# Patient Record
Sex: MALE | Race: OTHER | Employment: FULL TIME | URBAN - METROPOLITAN AREA
[De-identification: names, ages, dates, MRNs, and addresses within clinical notes are randomized per-mention and may not be internally consistent; named-entity substitution may affect disease eponyms.]

---

## 2015-06-08 LAB — ANTIBODY: NORMAL

## 2023-04-27 LAB
CHOLESTEROL, TOTAL: 184 MG/DL
CHOLESTEROL/HDL RATIO: NORMAL
HDLC SERPL-MCNC: 47 MG/DL (ref 35–70)
LDL CHOLESTEROL CALCULATED: 115 MG/DL (ref 0–160)
NONHDLC SERPL-MCNC: 137 MG/DL
TRIGL SERPL-MCNC: 110 MG/DL
VLDLC SERPL CALC-MCNC: NORMAL MG/DL

## 2023-08-10 LAB
ESTIMATED AVERAGE GLUCOSE: NORMAL
HBA1C MFR BLD: 9.8 %

## 2024-01-26 ENCOUNTER — OFFICE VISIT (OUTPATIENT)
Dept: PRIMARY CARE CLINIC | Age: 64
End: 2024-01-26
Payer: COMMERCIAL

## 2024-01-26 VITALS
SYSTOLIC BLOOD PRESSURE: 122 MMHG | HEART RATE: 93 BPM | TEMPERATURE: 97.2 F | WEIGHT: 202.7 LBS | BODY MASS INDEX: 28.38 KG/M2 | RESPIRATION RATE: 18 BRPM | DIASTOLIC BLOOD PRESSURE: 74 MMHG | HEIGHT: 71 IN | OXYGEN SATURATION: 90 %

## 2024-01-26 DIAGNOSIS — Z23 NEED FOR VACCINATION: ICD-10-CM

## 2024-01-26 DIAGNOSIS — Z12.5 ENCOUNTER FOR SCREENING FOR MALIGNANT NEOPLASM OF PROSTATE: ICD-10-CM

## 2024-01-26 DIAGNOSIS — E55.9 VITAMIN D DEFICIENCY: ICD-10-CM

## 2024-01-26 DIAGNOSIS — K21.9 GASTROESOPHAGEAL REFLUX DISEASE WITHOUT ESOPHAGITIS: ICD-10-CM

## 2024-01-26 DIAGNOSIS — E78.2 MIXED HYPERLIPIDEMIA: ICD-10-CM

## 2024-01-26 DIAGNOSIS — I10 PRIMARY HYPERTENSION: ICD-10-CM

## 2024-01-26 DIAGNOSIS — E11.8 DIABETES MELLITUS TYPE 2 WITH COMPLICATIONS (HCC): Primary | ICD-10-CM

## 2024-01-26 DIAGNOSIS — J45.50 SEVERE PERSISTENT ASTHMA WITHOUT COMPLICATION: ICD-10-CM

## 2024-01-26 PROBLEM — I25.10 CORONARY ATHEROSCLEROSIS: Status: ACTIVE | Noted: 2020-07-30

## 2024-01-26 PROBLEM — M54.12 CERVICAL RADICULOPATHY: Status: RESOLVED | Noted: 2022-11-23 | Resolved: 2024-01-26

## 2024-01-26 PROBLEM — M54.12 CERVICAL RADICULOPATHY: Status: ACTIVE | Noted: 2022-11-23

## 2024-01-26 PROBLEM — H90.2 CHL (CONDUCTIVE HEARING LOSS): Status: ACTIVE | Noted: 2018-09-05

## 2024-01-26 PROBLEM — N20.0 NEPHROLITHIASIS: Status: ACTIVE | Noted: 2024-01-26

## 2024-01-26 LAB
25(OH)D3 SERPL-MCNC: 25.8 NG/ML
ALBUMIN SERPL-MCNC: 4.2 G/DL (ref 3.4–5)
ALBUMIN/GLOB SERPL: 1.7 {RATIO} (ref 1.1–2.2)
ALP SERPL-CCNC: 99 U/L (ref 40–129)
ALT SERPL-CCNC: 15 U/L (ref 10–40)
ANION GAP SERPL CALCULATED.3IONS-SCNC: 13 MMOL/L (ref 3–16)
AST SERPL-CCNC: 15 U/L (ref 15–37)
BILIRUB SERPL-MCNC: 0.4 MG/DL (ref 0–1)
BUN SERPL-MCNC: 9 MG/DL (ref 7–20)
CALCIUM SERPL-MCNC: 9.2 MG/DL (ref 8.3–10.6)
CHLORIDE SERPL-SCNC: 101 MMOL/L (ref 99–110)
CHOLEST SERPL-MCNC: 264 MG/DL (ref 0–199)
CO2 SERPL-SCNC: 26 MMOL/L (ref 21–32)
CREAT SERPL-MCNC: 0.6 MG/DL (ref 0.8–1.3)
CREATININE URINE POCT: 100
DEPRECATED RDW RBC AUTO: 14.1 % (ref 12.4–15.4)
FOLATE SERPL-MCNC: 13.25 NG/ML (ref 4.78–24.2)
GFR SERPLBLD CREATININE-BSD FMLA CKD-EPI: >60 ML/MIN/{1.73_M2}
GLUCOSE SERPL-MCNC: 151 MG/DL (ref 70–99)
HCT VFR BLD AUTO: 46.4 % (ref 40.5–52.5)
HDLC SERPL-MCNC: 61 MG/DL (ref 40–60)
HGB BLD-MCNC: 15.3 G/DL (ref 13.5–17.5)
LDLC SERPL CALC-MCNC: 171 MG/DL
MAGNESIUM SERPL-MCNC: 2.1 MG/DL (ref 1.8–2.4)
MCH RBC QN AUTO: 29 PG (ref 26–34)
MCHC RBC AUTO-ENTMCNC: 33 G/DL (ref 31–36)
MCV RBC AUTO: 87.8 FL (ref 80–100)
MICROALBUMIN/CREAT 24H UR: 10 MG/G{CREAT}
MICROALBUMIN/CREAT UR-RTO: <30
PLATELET # BLD AUTO: 218 K/UL (ref 135–450)
PMV BLD AUTO: 8.6 FL (ref 5–10.5)
POTASSIUM SERPL-SCNC: 4.8 MMOL/L (ref 3.5–5.1)
PROT SERPL-MCNC: 6.7 G/DL (ref 6.4–8.2)
PSA SERPL DL<=0.01 NG/ML-MCNC: 0.83 NG/ML (ref 0–4)
RBC # BLD AUTO: 5.28 M/UL (ref 4.2–5.9)
SODIUM SERPL-SCNC: 140 MMOL/L (ref 136–145)
TRIGL SERPL-MCNC: 160 MG/DL (ref 0–150)
TSH SERPL DL<=0.005 MIU/L-ACNC: 1.48 UIU/ML (ref 0.27–4.2)
VIT B12 SERPL-MCNC: >2000 PG/ML (ref 211–911)
VLDLC SERPL CALC-MCNC: 32 MG/DL
WBC # BLD AUTO: 6.9 K/UL (ref 4–11)

## 2024-01-26 PROCEDURE — 3052F HG A1C>EQUAL 8.0%<EQUAL 9.0%: CPT | Performed by: FAMILY MEDICINE

## 2024-01-26 PROCEDURE — 90471 IMMUNIZATION ADMIN: CPT | Performed by: FAMILY MEDICINE

## 2024-01-26 PROCEDURE — 90677 PCV20 VACCINE IM: CPT | Performed by: FAMILY MEDICINE

## 2024-01-26 PROCEDURE — 99205 OFFICE O/P NEW HI 60 MIN: CPT | Performed by: FAMILY MEDICINE

## 2024-01-26 PROCEDURE — 3074F SYST BP LT 130 MM HG: CPT | Performed by: FAMILY MEDICINE

## 2024-01-26 PROCEDURE — 36415 COLL VENOUS BLD VENIPUNCTURE: CPT | Performed by: FAMILY MEDICINE

## 2024-01-26 PROCEDURE — 3078F DIAST BP <80 MM HG: CPT | Performed by: FAMILY MEDICINE

## 2024-01-26 PROCEDURE — 82044 UR ALBUMIN SEMIQUANTITATIVE: CPT | Performed by: FAMILY MEDICINE

## 2024-01-26 RX ORDER — INSULIN LISPRO 100 [IU]/ML
2 INJECTION, SOLUTION INTRAVENOUS; SUBCUTANEOUS
COMMUNITY
Start: 2013-08-28 | End: 2024-01-27

## 2024-01-26 RX ORDER — TAMSULOSIN HYDROCHLORIDE 0.4 MG/1
CAPSULE ORAL DAILY
COMMUNITY

## 2024-01-26 RX ORDER — MAGNESIUM OXIDE 400 MG/1
1 TABLET ORAL DAILY
COMMUNITY
Start: 2023-12-27

## 2024-01-26 RX ORDER — MONTELUKAST SODIUM 10 MG/1
10 TABLET ORAL EVERY MORNING
COMMUNITY
Start: 2013-04-19

## 2024-01-26 RX ORDER — EPINEPHRINE 0.3 MG/.3ML
INJECTION SUBCUTANEOUS
COMMUNITY
Start: 2020-05-19

## 2024-01-26 RX ORDER — ACYCLOVIR 400 MG/1
TABLET ORAL
COMMUNITY
Start: 2023-12-06 | End: 2024-01-27 | Stop reason: SDUPTHER

## 2024-01-26 RX ORDER — PIOGLITAZONEHYDROCHLORIDE 30 MG/1
30 TABLET ORAL EVERY EVENING
COMMUNITY
Start: 2023-12-30 | End: 2024-01-27 | Stop reason: SDUPTHER

## 2024-01-26 RX ORDER — LISINOPRIL 2.5 MG/1
2.5 TABLET ORAL DAILY
COMMUNITY

## 2024-01-26 RX ORDER — FLUTICASONE PROPIONATE AND SALMETEROL 500; 50 UG/1; UG/1
1 POWDER RESPIRATORY (INHALATION) 2 TIMES DAILY
COMMUNITY

## 2024-01-26 RX ORDER — IBUPROFEN 600 MG/1
TABLET ORAL
COMMUNITY
Start: 2024-01-17

## 2024-01-26 RX ORDER — ONDANSETRON 4 MG/1
TABLET, ORALLY DISINTEGRATING ORAL
COMMUNITY

## 2024-01-26 RX ORDER — INSULIN LISPRO 100 [IU]/ML
14 INJECTION, SOLUTION INTRAVENOUS; SUBCUTANEOUS
COMMUNITY
Start: 2023-01-26

## 2024-01-26 RX ORDER — BUDESONIDE 0.5 MG/2ML
INHALANT ORAL
COMMUNITY

## 2024-01-26 RX ORDER — ASPIRIN 81 MG/1
81 TABLET ORAL DAILY
COMMUNITY

## 2024-01-26 RX ORDER — ESOMEPRAZOLE MAGNESIUM 40 MG/1
1 CAPSULE, DELAYED RELEASE ORAL 2 TIMES DAILY
COMMUNITY

## 2024-01-26 RX ORDER — OMEPRAZOLE 40 MG/1
40 CAPSULE, DELAYED RELEASE ORAL DAILY PRN
COMMUNITY

## 2024-01-26 RX ORDER — OXYCODONE HYDROCHLORIDE 5 MG/1
TABLET ORAL
COMMUNITY
Start: 2024-01-17

## 2024-01-26 RX ORDER — IPRATROPIUM BROMIDE AND ALBUTEROL SULFATE 2.5; .5 MG/3ML; MG/3ML
SOLUTION RESPIRATORY (INHALATION)
COMMUNITY

## 2024-01-26 RX ORDER — AZELASTINE HYDROCHLORIDE, FLUTICASONE PROPIONATE 137; 50 UG/1; UG/1
1 SPRAY, METERED NASAL 2 TIMES DAILY PRN
COMMUNITY

## 2024-01-26 RX ORDER — ROSUVASTATIN CALCIUM 10 MG/1
1 TABLET, COATED ORAL DAILY
COMMUNITY
Start: 2022-11-18

## 2024-01-26 SDOH — ECONOMIC STABILITY: FOOD INSECURITY: WITHIN THE PAST 12 MONTHS, THE FOOD YOU BOUGHT JUST DIDN'T LAST AND YOU DIDN'T HAVE MONEY TO GET MORE.: NEVER TRUE

## 2024-01-26 SDOH — ECONOMIC STABILITY: INCOME INSECURITY: HOW HARD IS IT FOR YOU TO PAY FOR THE VERY BASICS LIKE FOOD, HOUSING, MEDICAL CARE, AND HEATING?: NOT HARD AT ALL

## 2024-01-26 SDOH — ECONOMIC STABILITY: HOUSING INSECURITY
IN THE LAST 12 MONTHS, WAS THERE A TIME WHEN YOU DID NOT HAVE A STEADY PLACE TO SLEEP OR SLEPT IN A SHELTER (INCLUDING NOW)?: NO

## 2024-01-26 SDOH — ECONOMIC STABILITY: FOOD INSECURITY: WITHIN THE PAST 12 MONTHS, YOU WORRIED THAT YOUR FOOD WOULD RUN OUT BEFORE YOU GOT MONEY TO BUY MORE.: NEVER TRUE

## 2024-01-26 ASSESSMENT — PATIENT HEALTH QUESTIONNAIRE - PHQ9
SUM OF ALL RESPONSES TO PHQ QUESTIONS 1-9: 0
SUM OF ALL RESPONSES TO PHQ QUESTIONS 1-9: 0
1. LITTLE INTEREST OR PLEASURE IN DOING THINGS: 0
SUM OF ALL RESPONSES TO PHQ QUESTIONS 1-9: 0
2. FEELING DOWN, DEPRESSED OR HOPELESS: 0
SUM OF ALL RESPONSES TO PHQ QUESTIONS 1-9: 0
SUM OF ALL RESPONSES TO PHQ9 QUESTIONS 1 & 2: 0

## 2024-01-26 NOTE — PROGRESS NOTES
Hyperlipidemia 11/14/2014    Hypertension 11/14/2014    Last Assessment & Plan:    Formatting of this note might be different from the original.   - Continue lisinopril 5 mg    Malignant neoplasm of urinary bladder (HCC) 07/18/2011    Microalbuminuria 03/08/2016    Moderate persistent asthma with acute exacerbation 06/07/2015    Nephrolithiasis 01/26/2024    Nonalcoholic fatty liver disease 08/08/2013    Obesity 05/14/2014    Obstructive sleep apnea syndrome 06/15/2012    Formatting of this note might be different from the original.   BPAP at 11/5 cwp  Formatting of this note might be different from the original.   Follows with Sleep Medicine. Prescribed AutoPap with minimum pressure of 5 and maximum of 14 cm water         Last Assessment & Plan:    Formatting of this note might be different from the original.   Waking up in AM with headaches and machine not workin    Vitamin D deficiency 12/01/2017    Last Assessment & Plan:    Formatting of this note might be different from the original.   - Continue vitamin D supplementation     History reviewed. No pertinent surgical history.   Social History     Tobacco Use    Smoking status: Never    Smokeless tobacco: Never   Substance Use Topics    Alcohol use: Not on file      History reviewed. No pertinent family history.  Current Outpatient Medications on File Prior to Visit   Medication Sig Dispense Refill    aspirin 81 MG EC tablet Take 1 tablet by mouth daily      Azelastine-Fluticasone 137-50 MCG/ACT SUSP 1 spray by Nasal route 2 times daily as needed      benralizumab (FASENRA) 30 MG/ML SOSY injection Inject 1 mL into the skin      budesonide (PULMICORT) 0.5 MG/2ML nebulizer suspension INHALE 2 MLS (0.5 MG TOTAL) BY NEBULIZATION 2 TIMES A DAY.      EPINEPHrine (EPIPEN) 0.3 MG/0.3ML SOAJ injection Use as needed for emergency allergic reaction      esomeprazole (NEXIUM) 40 MG delayed release capsule Take 1 capsule by mouth 2 times daily      fluticasone-salmeterol

## 2024-01-27 LAB
EST. AVERAGE GLUCOSE BLD GHB EST-MCNC: 211.6 MG/DL
HBA1C MFR BLD: 9 %

## 2024-01-27 RX ORDER — ACYCLOVIR 400 MG/1
1 TABLET ORAL
Qty: 12 EACH | Refills: 4 | Status: SHIPPED | OUTPATIENT
Start: 2024-01-27

## 2024-01-27 RX ORDER — PIOGLITAZONEHYDROCHLORIDE 30 MG/1
30 TABLET ORAL EVERY EVENING
Qty: 90 TABLET | Refills: 1 | Status: SHIPPED | OUTPATIENT
Start: 2024-01-27

## 2024-02-15 RX ORDER — ALBUTEROL SULFATE 90 UG/1
AEROSOL, METERED RESPIRATORY (INHALATION)
COMMUNITY

## 2024-02-23 ENCOUNTER — OFFICE VISIT (OUTPATIENT)
Dept: PRIMARY CARE CLINIC | Age: 64
End: 2024-02-23
Payer: COMMERCIAL

## 2024-02-23 VITALS
HEIGHT: 71 IN | OXYGEN SATURATION: 95 % | WEIGHT: 206.8 LBS | BODY MASS INDEX: 28.95 KG/M2 | SYSTOLIC BLOOD PRESSURE: 116 MMHG | DIASTOLIC BLOOD PRESSURE: 70 MMHG | TEMPERATURE: 97.4 F | HEART RATE: 70 BPM | RESPIRATION RATE: 18 BRPM

## 2024-02-23 DIAGNOSIS — E11.8 DIABETES MELLITUS TYPE 2 WITH COMPLICATIONS (HCC): ICD-10-CM

## 2024-02-23 DIAGNOSIS — E11.59 HYPERTENSION ASSOCIATED WITH TYPE 2 DIABETES MELLITUS (HCC): ICD-10-CM

## 2024-02-23 DIAGNOSIS — M54.50 ACUTE BILATERAL LOW BACK PAIN WITHOUT SCIATICA: ICD-10-CM

## 2024-02-23 DIAGNOSIS — M79.10 MUSCLE PAIN: ICD-10-CM

## 2024-02-23 DIAGNOSIS — E78.5 HYPERLIPIDEMIA ASSOCIATED WITH TYPE 2 DIABETES MELLITUS (HCC): Primary | ICD-10-CM

## 2024-02-23 DIAGNOSIS — I73.9 CLAUDICATION OF BOTH LOWER EXTREMITIES (HCC): ICD-10-CM

## 2024-02-23 DIAGNOSIS — E11.69 HYPERLIPIDEMIA ASSOCIATED WITH TYPE 2 DIABETES MELLITUS (HCC): Primary | ICD-10-CM

## 2024-02-23 DIAGNOSIS — I15.2 HYPERTENSION ASSOCIATED WITH TYPE 2 DIABETES MELLITUS (HCC): ICD-10-CM

## 2024-02-23 LAB
ALBUMIN SERPL-MCNC: 4.6 G/DL (ref 3.4–5)
ALBUMIN/GLOB SERPL: 1.8 {RATIO} (ref 1.1–2.2)
ALP SERPL-CCNC: 106 U/L (ref 40–129)
ALT SERPL-CCNC: 11 U/L (ref 10–40)
ANION GAP SERPL CALCULATED.3IONS-SCNC: 5 MMOL/L (ref 3–16)
AST SERPL-CCNC: 12 U/L (ref 15–37)
BACTERIA URNS QL MICRO: NORMAL /HPF
BILIRUB SERPL-MCNC: 0.5 MG/DL (ref 0–1)
BILIRUB UR QL STRIP.AUTO: NEGATIVE
BUN SERPL-MCNC: 14 MG/DL (ref 7–20)
CALCIUM SERPL-MCNC: 9.7 MG/DL (ref 8.3–10.6)
CHLORIDE SERPL-SCNC: 105 MMOL/L (ref 99–110)
CK SERPL-CCNC: 94 U/L (ref 39–308)
CLARITY UR: CLEAR
CO2 SERPL-SCNC: 30 MMOL/L (ref 21–32)
COLOR UR: YELLOW
CREAT SERPL-MCNC: 0.7 MG/DL (ref 0.8–1.3)
CRP SERPL-MCNC: <3 MG/L (ref 0–5.1)
EPI CELLS #/AREA URNS AUTO: 0 /HPF (ref 0–5)
ERYTHROCYTE [SEDIMENTATION RATE] IN BLOOD BY WESTERGREN METHOD: 13 MM/HR (ref 0–20)
GFR SERPLBLD CREATININE-BSD FMLA CKD-EPI: >60 ML/MIN/{1.73_M2}
GLUCOSE SERPL-MCNC: 164 MG/DL (ref 70–99)
GLUCOSE UR STRIP.AUTO-MCNC: NEGATIVE MG/DL
HGB UR QL STRIP.AUTO: NEGATIVE
HYALINE CASTS #/AREA URNS AUTO: 0 /LPF (ref 0–8)
KETONES UR STRIP.AUTO-MCNC: NEGATIVE MG/DL
LEUKOCYTE ESTERASE UR QL STRIP.AUTO: NEGATIVE
NITRITE UR QL STRIP.AUTO: NEGATIVE
PH UR STRIP.AUTO: 5.5 [PH] (ref 5–8)
POTASSIUM SERPL-SCNC: 5.4 MMOL/L (ref 3.5–5.1)
PROT SERPL-MCNC: 7.1 G/DL (ref 6.4–8.2)
PROT UR STRIP.AUTO-MCNC: NEGATIVE MG/DL
RBC CLUMPS #/AREA URNS AUTO: 1 /HPF (ref 0–4)
RHEUMATOID FACT SER IA-ACNC: <10 IU/ML
SODIUM SERPL-SCNC: 140 MMOL/L (ref 136–145)
SP GR UR STRIP.AUTO: 1.02 (ref 1–1.03)
UA DIPSTICK W REFLEX MICRO PNL UR: NORMAL
URN SPEC COLLECT METH UR: NORMAL
UROBILINOGEN UR STRIP-ACNC: 0.2 E.U./DL
WBC #/AREA URNS AUTO: 1 /HPF (ref 0–5)

## 2024-02-23 PROCEDURE — 99214 OFFICE O/P EST MOD 30 MIN: CPT | Performed by: FAMILY MEDICINE

## 2024-02-23 PROCEDURE — 36415 COLL VENOUS BLD VENIPUNCTURE: CPT | Performed by: FAMILY MEDICINE

## 2024-02-23 PROCEDURE — 3078F DIAST BP <80 MM HG: CPT | Performed by: FAMILY MEDICINE

## 2024-02-23 PROCEDURE — 3074F SYST BP LT 130 MM HG: CPT | Performed by: FAMILY MEDICINE

## 2024-02-23 PROCEDURE — 3052F HG A1C>EQUAL 8.0%<EQUAL 9.0%: CPT | Performed by: FAMILY MEDICINE

## 2024-02-24 LAB — CCP IGG SERPL-ACNC: <0.5 U/ML (ref 0–2.9)

## 2024-02-25 LAB — BACTERIA UR CULT: NORMAL

## 2024-03-01 ENCOUNTER — HOSPITAL ENCOUNTER (OUTPATIENT)
Age: 64
Discharge: HOME | End: 2024-03-01
Payer: COMMERCIAL

## 2024-03-01 DIAGNOSIS — I73.9 CLAUDICATION OF BOTH LOWER EXTREMITIES (HCC): ICD-10-CM

## 2024-03-01 PROCEDURE — 93922 UPR/L XTREMITY ART 2 LEVELS: CPT
